# Patient Record
Sex: MALE | Race: OTHER
[De-identification: names, ages, dates, MRNs, and addresses within clinical notes are randomized per-mention and may not be internally consistent; named-entity substitution may affect disease eponyms.]

---

## 2019-06-30 ENCOUNTER — HOSPITAL ENCOUNTER (EMERGENCY)
Dept: HOSPITAL 47 - EC | Age: 74
Discharge: HOME | End: 2019-06-30
Payer: MEDICARE

## 2019-06-30 VITALS — HEART RATE: 97 BPM | RESPIRATION RATE: 16 BRPM | DIASTOLIC BLOOD PRESSURE: 88 MMHG | SYSTOLIC BLOOD PRESSURE: 115 MMHG

## 2019-06-30 VITALS — TEMPERATURE: 98.4 F

## 2019-06-30 DIAGNOSIS — R33.9: Primary | ICD-10-CM

## 2019-06-30 DIAGNOSIS — Z87.438: ICD-10-CM

## 2019-06-30 LAB
ANION GAP SERPL CALC-SCNC: 16 MMOL/L
BASOPHILS # BLD AUTO: 0 K/UL (ref 0–0.2)
BASOPHILS NFR BLD AUTO: 0 %
BUN SERPL-SCNC: 15 MG/DL (ref 9–20)
CALCIUM SPEC-MCNC: 10.3 MG/DL (ref 8.4–10.2)
CHLORIDE SERPL-SCNC: 108 MMOL/L (ref 98–107)
CO2 SERPL-SCNC: 17 MMOL/L (ref 22–30)
EOSINOPHIL # BLD AUTO: 0 K/UL (ref 0–0.7)
EOSINOPHIL NFR BLD AUTO: 0 %
ERYTHROCYTE [DISTWIDTH] IN BLOOD BY AUTOMATED COUNT: 5.04 M/UL (ref 4.3–5.9)
ERYTHROCYTE [DISTWIDTH] IN BLOOD: 16 % (ref 11.5–15.5)
GLUCOSE SERPL-MCNC: 195 MG/DL (ref 74–99)
HCT VFR BLD AUTO: 45.3 % (ref 39–53)
HGB BLD-MCNC: 15.4 GM/DL (ref 13–17.5)
LYMPHOCYTES # SPEC AUTO: 1.1 K/UL (ref 1–4.8)
LYMPHOCYTES NFR SPEC AUTO: 9 %
MCH RBC QN AUTO: 30.6 PG (ref 25–35)
MCHC RBC AUTO-ENTMCNC: 34 G/DL (ref 31–37)
MCV RBC AUTO: 89.9 FL (ref 80–100)
MONOCYTES # BLD AUTO: 0.3 K/UL (ref 0–1)
MONOCYTES NFR BLD AUTO: 2 %
NEUTROPHILS # BLD AUTO: 10.9 K/UL (ref 1.3–7.7)
NEUTROPHILS NFR BLD AUTO: 88 %
PH UR: 5 [PH] (ref 5–8)
PLATELET # BLD AUTO: 273 K/UL (ref 150–450)
POTASSIUM SERPL-SCNC: 4.9 MMOL/L (ref 3.5–5.1)
SODIUM SERPL-SCNC: 141 MMOL/L (ref 137–145)
SP GR UR: 1.01 (ref 1–1.03)
UROBILINOGEN UR QL STRIP: <2 MG/DL (ref ?–2)
WBC # BLD AUTO: 12.4 K/UL (ref 3.8–10.6)

## 2019-06-30 PROCEDURE — 51702 INSERT TEMP BLADDER CATH: CPT

## 2019-06-30 PROCEDURE — 80048 BASIC METABOLIC PNL TOTAL CA: CPT

## 2019-06-30 PROCEDURE — 85025 COMPLETE CBC W/AUTO DIFF WBC: CPT

## 2019-06-30 PROCEDURE — 36415 COLL VENOUS BLD VENIPUNCTURE: CPT

## 2019-06-30 PROCEDURE — 81003 URINALYSIS AUTO W/O SCOPE: CPT

## 2019-06-30 PROCEDURE — 99284 EMERGENCY DEPT VISIT MOD MDM: CPT

## 2019-06-30 NOTE — ED
General Adult HPI





- General


Chief complaint: Urogenital


Stated complaint: Urinary Issues


Time Seen by Provider: 06/30/19 20:12


Source: patient


Mode of arrival: wheelchair


Limitations: no limitations





- History of Present Illness


Initial comments: 


84-year-old male presenting with urinary retention.  Patient states he is a 

history of BPH.  He takes medications daily.  He states this is the second time 

this has happened to him.  Last time he urinated was yesterday.  He states he 

was in [place] visiting family when they decided to stop and [place] he is 

unable to tolerate the symptoms any longer.  Patient lives in [place].  

Denies any fevers chills, chest pain, short of breath, nausea, vomiting, 

diarrhea.








- Related Data


                                    Allergies











Allergy/AdvReac Type Severity Reaction Status Date / Time


 


No Known Allergies Allergy   Verified 06/30/19 20:05














Review of Systems


ROS Statement: 


Those systems with pertinent positive or pertinent negative responses have been 

documented in the HPI.


Review of Systems


Constitutional: Denies fever, chills 


Eyes: Denies change in vision, Denies pain


Ears, nose, mouth, throat: Denies headaches, Denies sore throat


Cardiovascular: Denies chest pain. Denies palpitations 


Respiratory: Denies shortness of breath, Denies cough


Gastrointestinal: Denies abdominal pain. Denies nausea, vomiting, diarrhea. 


Genitourinary: Denies hematuria, Denies infections


Musculoskeletal: Denies pain, Denies swelling


Integumentary: Denies rash


Neurological: Denies headache, focal weakness, focal numbness


Psychiatric: Denies anxiety, Denies depression


Hematologic/Lymphatic: Denies easy bleeding or bruising 


ROS Other: All systems not noted in ROS Statement are negative.





Past Medical History


Past Medical History: Atrial Fibrillation, Diabetes Mellitus, Hyperlipidemia


Additional Past Medical History / Comment(s): enlarged prostate


History of Any Multi-Drug Resistant Organisms: None Reported


Past Surgical History: No Surgical Hx Reported


Past Psychological History: No Psychological Hx Reported


Smoking Status: Never smoker


Past Alcohol Use History: None Reported


Past Drug Use History: None Reported





General Exam





- General Exam Comments


Initial Comments: 


General: Awake, alert, No acute Distress


HENT: Normocephalic. Atraumatic


Eyes: PERRL. EOMI. No scleral icterus. No injected conjunctiva


Neck: Full ROM


Chest/Lungs: Clear to auscultation bilaterally. No wheezing, rhonchi, or rales


Cardiac: Regular rate, rhythm. No murmurs or rubs


Abdomen/GI: Soft, nontender, nondistended. No rebound, guarding, or rigidity.


Musculoskeletal: Full ROM 


Skin: Warm, dry, intact


Neurologic: A/Ox3, no weakness, no sensory deficit, no abnormal gait, no 

coordination deficit





Limitations: no limitations





Course


                                   Vital Signs











  06/30/19 06/30/19 06/30/19





  20:02 21:17 21:36


 


Temperature 98.4 F  98.4 F


 


Pulse Rate 109 H 97 97


 


Respiratory 18 16 16





Rate   


 


Blood Pressure 162/83 115/88 115/88


 


O2 Sat by Pulse 99 95 95





Oximetry   














Medical Decision Making





- Medical Decision Making


74-year-old male presenting with urinary retention.  A Lopez was placed prior to

me seeing the patient with return of almost 2 L of clear urine.  Abdominal 

discomfort resolved after this.  His workup did not show any UTI or CHERRY.  

Patient has a urologist in [place] where he lives.  He states he can follow-

up with them this week. No further emergent workup indicated. The patient was 

given return to ED instructions. They were instructed to follow up with their 

primary care provider. Stable for discharge at this time. 








- Lab Data


Result diagrams: 


                                 06/30/19 20:20





                                 06/30/19 20:20


                                   Lab Results











  06/30/19 06/30/19 06/30/19 Range/Units





  20:20 20:20 20:20 


 


WBC   12.4 H   (3.8-10.6)  k/uL


 


RBC   5.04   (4.30-5.90)  m/uL


 


Hgb   15.4   (13.0-17.5)  gm/dL


 


Hct   45.3   (39.0-53.0)  %


 


MCV   89.9   (80.0-100.0)  fL


 


MCH   30.6   (25.0-35.0)  pg


 


MCHC   34.0   (31.0-37.0)  g/dL


 


RDW   16.0 H   (11.5-15.5)  %


 


Plt Count   273   (150-450)  k/uL


 


Neutrophils %   88   %


 


Lymphocytes %   9   %


 


Monocytes %   2   %


 


Eosinophils %   0   %


 


Basophils %   0   %


 


Neutrophils #   10.9 H   (1.3-7.7)  k/uL


 


Lymphocytes #   1.1   (1.0-4.8)  k/uL


 


Monocytes #   0.3   (0-1.0)  k/uL


 


Eosinophils #   0.0   (0-0.7)  k/uL


 


Basophils #   0.0   (0-0.2)  k/uL


 


Sodium  141    (137-145)  mmol/L


 


Potassium  4.9    (3.5-5.1)  mmol/L


 


Chloride  108 H    ()  mmol/L


 


Carbon Dioxide  17 L    (22-30)  mmol/L


 


Anion Gap  16    mmol/L


 


BUN  15    (9-20)  mg/dL


 


Creatinine  1.10    (0.66-1.25)  mg/dL


 


Est GFR (CKD-EPI)AfAm  77    (>60 ml/min/1.73 sqM)  


 


Est GFR (CKD-EPI)NonAf  67    (>60 ml/min/1.73 sqM)  


 


Glucose  195 H    (74-99)  mg/dL


 


Calcium  10.3 H    (8.4-10.2)  mg/dL


 


Urine Color    Light Yellow  


 


Urine Appearance    Clear  (Clear)  


 


Urine pH    5.0  (5.0-8.0)  


 


Ur Specific Gravity    1.007  (1.001-1.035)  


 


Urine Protein    Negative  (Negative)  


 


Urine Glucose (UA)    Negative  (Negative)  


 


Urine Ketones    Negative  (Negative)  


 


Urine Blood    Negative  (Negative)  


 


Urine Nitrite    Negative  (Negative)  


 


Urine Bilirubin    Negative  (Negative)  


 


Urine Urobilinogen    <2.0  (<2.0)  mg/dL


 


Ur Leukocyte Esterase    Negative  (Negative)  














Disposition


Clinical Impression: 


 Urinary retention





Disposition: HOME SELF-CARE


Condition: Good


Instructions (If sedation given, give patient instructions):  Lopez Catheter 

Placement and Care (ED)


Additional Instructions: 


Follow up with your urologist this week 


Is patient prescribed a controlled substance at d/c from ED?: No


Referrals: 


Nonstaff,Physician [Primary Care Provider] - 1-2 days

## 2019-07-20 ENCOUNTER — HOSPITAL ENCOUNTER (EMERGENCY)
Dept: HOSPITAL 47 - EC | Age: 74
Discharge: HOME | End: 2019-07-20
Payer: MEDICARE

## 2019-07-20 VITALS
TEMPERATURE: 98.2 F | HEART RATE: 82 BPM | DIASTOLIC BLOOD PRESSURE: 79 MMHG | SYSTOLIC BLOOD PRESSURE: 138 MMHG | RESPIRATION RATE: 18 BRPM

## 2019-07-20 DIAGNOSIS — R33.9: Primary | ICD-10-CM

## 2019-07-20 LAB
PH UR: 6 [PH] (ref 5–8)
SP GR UR: 1.01 (ref 1–1.03)
UROBILINOGEN UR QL STRIP: <2 MG/DL (ref ?–2)

## 2019-07-20 PROCEDURE — 81003 URINALYSIS AUTO W/O SCOPE: CPT

## 2019-07-20 PROCEDURE — 51702 INSERT TEMP BLADDER CATH: CPT

## 2019-07-20 PROCEDURE — 99283 EMERGENCY DEPT VISIT LOW MDM: CPT

## 2019-07-20 NOTE — ED
Male Urogenital HPI





- General


Chief complaint: Urogenital


Stated complaint: Urogenital


Time Seen by Provider: 07/20/19 15:46


Source: patient


Mode of arrival: wheelchair


Limitations: no limitations





- History of Present Illness


Initial comments: 





Patient is a 74-year-old male presenting to the emergency Department with 

complaints of urinary retention 1 day.  Patient states he has had this issue 

before, most recent about 3 weeks ago.  Patient states last time he urinated was

yesterday.  He was visiting family in [place] today and can no longer take the 

pain/pressure.  Patient does see a urologist and has an appointment later in the

month.  Patient has BPH.  He denies fever, chills.  Patient has no other 

complaints at this time.





- Related Data


                                    Allergies











Allergy/AdvReac Type Severity Reaction Status Date / Time


 


No Known Allergies Allergy   Verified 07/20/19 15:33














Review of Systems


ROS Statement: 


Those systems with pertinent positive or pertinent negative responses have been 

documented in the HPI.





ROS Other: All systems not noted in ROS Statement are negative.





Past Medical History


Past Medical History: Atrial Fibrillation, Diabetes Mellitus, Hyperlipidemia


Additional Past Medical History / Comment(s): enlarged prostate


History of Any Multi-Drug Resistant Organisms: None Reported


Past Surgical History: No Surgical Hx Reported


Past Psychological History: No Psychological Hx Reported


Smoking Status: Never smoker


Past Alcohol Use History: None Reported


Past Drug Use History: None Reported





General Exam





- General Exam Comments


Initial Comments: 





GENERAL: Well-appearing, well-nourished and in no acute distress, but appears 

very uncomfortable.


HEAD: Atraumatic, normocephalic.


EYES: Pupils equal round and reactive to light, extraocular movements intact, 

sclera anicteric, conjunctiva are normal.


ENT: TMs normal, nares patent, oropharynx clear without exudates.  Moist mucous 

membranes.


NECK: Normal range of motion, supple without lymphadenopathy or JVD.


LUNGS: Breath sounds clear to auscultation bilaterally and equal.  No wheezes 

rales or rhonchi.


HEART: Regular rate and rhythm without murmurs, rubs or gallops.


ABDOMEN: Pain/pressure with palpation of suprapubic area, over the bladder.  

Soft, normoactive bowel sounds.  No guarding, no rebound.  No masses 

appreciated.


: Deferred 


EXTREMITIES: Normal range of motion, no pitting or edema.  No clubbing or 

cyanosis.


NEUROLOGICAL: Cranial nerves II through XII grossly intact.  Normal speech, 

normal gait.


PSYCH: Normal mood, normal affect.


SKIN: Warm, Dry, normal turgor, no rashes or lesions noted.


Limitations: no limitations





Course


                                   Vital Signs











  07/20/19





  15:33


 


Temperature 98.2 F


 


Pulse Rate 82


 


Respiratory 18





Rate 


 


Blood Pressure 138/79


 


O2 Sat by Pulse 97





Oximetry 














Medical Decision Making





- Medical Decision Making





Patient is a 74-year-old male presenting with urinary retention 1 day.  Patient

has had this issue in the past as he has a history of BPH.  Patient does see a 

urologist in the [place] area.  Patient states last time he urinated 

was yesterday.  He was visiting his son in [place] and can no longer take the 

pain or pressure.  On exam patient has pain and pressure over the suprapubic 

area.  Bladder scan revealed 875 mL.  A Lopez catheter was placed and patient 

had immediate relief of pain and pressure.  Lopez will remain in place and 

patient will follow up with his urologist next week.  Patient and wife are in 

agreement with this plan.  UA is within normal limits.  Patient will be 

discharged home.  Return parameters were discussed with patient and he 

verbalized understanding.  Case discussed with Dr. Onofre. 





- Lab Data


                                   Lab Results











  07/20/19 Range/Units





  16:15 


 


Urine Color  Light Yellow  


 


Urine Appearance  Clear  (Clear)  


 


Urine pH  6.0  (5.0-8.0)  


 


Ur Specific Gravity  1.006  (1.001-1.035)  


 


Urine Protein  Negative  (Negative)  


 


Urine Glucose (UA)  Negative  (Negative)  


 


Urine Ketones  Negative  (Negative)  


 


Urine Blood  Negative  (Negative)  


 


Urine Nitrite  Negative  (Negative)  


 


Urine Bilirubin  Negative  (Negative)  


 


Urine Urobilinogen  <2.0  (<2.0)  mg/dL


 


Ur Leukocyte Esterase  Negative  (Negative)  














Disposition


Clinical Impression: 


 Urinary retention





Disposition: HOME SELF-CARE


Condition: Stable


Instructions (If sedation given, give patient instructions):  Urinary Retention 

in Men (ED)


Additional Instructions: 


Please return to the Emergency Department if symptoms worsen or any other 

concerns.


Follow up with urology


Is patient prescribed a controlled substance at d/c from ED?: No


Referrals: 


Nonstaff,Physician [Primary Care Provider] - 1-2 days


Neri Mina MD [STAFF PHYSICIAN] - 1-2 days